# Patient Record
Sex: FEMALE | Race: WHITE | NOT HISPANIC OR LATINO | Employment: FULL TIME | URBAN - METROPOLITAN AREA
[De-identification: names, ages, dates, MRNs, and addresses within clinical notes are randomized per-mention and may not be internally consistent; named-entity substitution may affect disease eponyms.]

---

## 2024-10-11 ENCOUNTER — OFFICE VISIT (OUTPATIENT)
Dept: ORTHOPEDIC SURGERY | Facility: CLINIC | Age: 34
End: 2024-10-11
Payer: COMMERCIAL

## 2024-10-11 ENCOUNTER — HOSPITAL ENCOUNTER (OUTPATIENT)
Dept: RADIOLOGY | Facility: CLINIC | Age: 34
Discharge: HOME | End: 2024-10-11
Payer: COMMERCIAL

## 2024-10-11 VITALS — BODY MASS INDEX: 37.11 KG/M2 | WEIGHT: 259.2 LBS | HEIGHT: 70 IN

## 2024-10-11 DIAGNOSIS — M25.561 ACUTE PAIN OF RIGHT KNEE: Primary | ICD-10-CM

## 2024-10-11 DIAGNOSIS — M25.561 ACUTE PAIN OF RIGHT KNEE: ICD-10-CM

## 2024-10-11 PROCEDURE — 73564 X-RAY EXAM KNEE 4 OR MORE: CPT | Mod: RT

## 2024-10-11 PROCEDURE — 1036F TOBACCO NON-USER: CPT

## 2024-10-11 PROCEDURE — 3008F BODY MASS INDEX DOCD: CPT

## 2024-10-11 PROCEDURE — 99213 OFFICE O/P EST LOW 20 MIN: CPT

## 2024-10-11 PROCEDURE — 99203 OFFICE O/P NEW LOW 30 MIN: CPT

## 2024-10-13 NOTE — PROGRESS NOTES
Subjective    Patient ID: Fidelina Spain is a 34 y.o. female.    Chief Complaint: Edema of the Right Knee (Right knee injury 4 weeks ago playing volleyball)     HPI  This is a pleasant 34-year-old female presenting to the office for evaluation of right knee pain, which occurred from an injury while playing volleyball.  Patient states that approximately 4 weeks ago, she landed awkwardly onto her right knee.  Believes she may have twisted her knee.  Ever since this injury, she has felt limited range of motion, tightness, weakness and instability of the right knee.  She will notice intermittent swelling.  She is having mechanical symptoms of knee buckling and giving out.  She is having difficulty with any bending or squatting activity, as well as going up and down stairs.  Denies numbness and paresthesia of the right lower extremity.  She has been using an economy hinged brace with minimal relief of symptoms.  She has been taking ibuprofen and resting without relief of symptoms.  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    Objective   Ortho Exam  Pleasant in no acute distress.  Walks in the office today with a mildly antalgic gait, use of an economy hinged brace.  Right knee appearing without soft tissue swelling erythema or ecchymosis.  There is diffuse tenderness throughout the right knee including focal tenderness to lateral joint line and popliteal fossa.  No significant patellofemoral crepitus no with range of motion testing.  Skin is intact and there is no warmth upon touch.  Right knee range of motion is approximately 3 to 115 degrees.  The knee is stable to varus and valgus stress, Lachman and posterior drawer.  Harshad's is positive with pain laterally.  Right lower extremity is well-perfused skin is intact and muscle tone is adequate.    Image Results:  XR knee right 4+ views  Narrative: Interpreted By:  Yohannes Hernandez,   STUDY:  XR KNEE RIGHT 4+ VIEWS; ;  10/11/2024 8:05 am       INDICATION:  Signs/Symptoms:right knee pain.      ,M25.561 Pain in right knee      COMPARISON:  None.      ACCESSION NUMBER(S):  [id number]      ORDERING CLINICIAN:  MARAL DEL ROSARIO      FINDINGS:  Bilateral knees, four views of the right and two views of the left      There is no fracture. There is no dislocation. There are no  degenerative changes. There is no lytic or sclerotic lesion. There is  no soft tissue abnormality seen. There is no effusion      Impression: Normal radiographs of the knees          MACRO:  None      Signed by: Yohannes Hernandez 10/12/2024 10:19 AM  Dictation workstation:   [id number]    Multiple view x-rays of the right knee obtained today personally reviewed, without evidence of acute fracture or dislocation.  No bony abnormality noted.    Assessment/Plan   Encounter Diagnoses:  Acute pain of right knee, concern for lateral meniscus tear    Plan: Discussion with patient in regards to right knee pain with review of today's x-rays.  Differential diagnoses were discussed, explained to patient that due to her mechanism of injury, focal pain to lateral knee, as well as a positive Harshad's exam, there is high concern for a lateral meniscus tear.  She is having symptoms of instability and mechanical symptoms of knee giving out and buckling.  Because of this, physical therapy would not be of benefit at this time, as she is having mechanical symptoms of knee giving out and buckling, risk for falls.  Patient should obtain an MRI of the right knee to evaluate for a lateral meniscus tear.  Patient was advised that pending MRI results, she may need follow-up with an orthopedic surgeon to discuss possible surgical intervention with knee arthroscopy.  In the meantime, she will continue with her economy hinged brace.  She can continue to take ibuprofen or Tylenol as well as ice and elevate right lower extremity.  She will avoid aggravating activities.  I will follow-up with patient once MRI results  are complete for further treatment options.    Orders Placed This Encounter    XR knee right 4+ views    MR knee right wo IV contrast     No follow-ups on file.